# Patient Record
Sex: MALE | Race: BLACK OR AFRICAN AMERICAN | NOT HISPANIC OR LATINO | URBAN - METROPOLITAN AREA
[De-identification: names, ages, dates, MRNs, and addresses within clinical notes are randomized per-mention and may not be internally consistent; named-entity substitution may affect disease eponyms.]

---

## 2022-03-17 ENCOUNTER — INPATIENT (INPATIENT)
Facility: HOSPITAL | Age: 57
LOS: 3 days | Discharge: HOME | End: 2022-03-21
Attending: INTERNAL MEDICINE | Admitting: INTERNAL MEDICINE
Payer: COMMERCIAL

## 2022-03-17 PROCEDURE — 93010 ELECTROCARDIOGRAM REPORT: CPT

## 2022-03-17 PROCEDURE — 99285 EMERGENCY DEPT VISIT HI MDM: CPT | Mod: 25

## 2022-03-18 VITALS
TEMPERATURE: 98 F | DIASTOLIC BLOOD PRESSURE: 89 MMHG | RESPIRATION RATE: 16 BRPM | OXYGEN SATURATION: 100 % | SYSTOLIC BLOOD PRESSURE: 148 MMHG | HEART RATE: 74 BPM

## 2022-03-18 DIAGNOSIS — I10 ESSENTIAL (PRIMARY) HYPERTENSION: ICD-10-CM

## 2022-03-18 DIAGNOSIS — Z98.890 OTHER SPECIFIED POSTPROCEDURAL STATES: Chronic | ICD-10-CM

## 2022-03-18 DIAGNOSIS — R07.9 CHEST PAIN, UNSPECIFIED: ICD-10-CM

## 2022-03-18 DIAGNOSIS — Z29.9 ENCOUNTER FOR PROPHYLACTIC MEASURES, UNSPECIFIED: ICD-10-CM

## 2022-03-18 DIAGNOSIS — J45.909 UNSPECIFIED ASTHMA, UNCOMPLICATED: ICD-10-CM

## 2022-03-18 LAB
ALBUMIN SERPL ELPH-MCNC: 4.4 G/DL — SIGNIFICANT CHANGE UP (ref 3.3–5)
ALP SERPL-CCNC: 48 U/L — SIGNIFICANT CHANGE UP (ref 40–120)
ALT FLD-CCNC: 23 U/L — SIGNIFICANT CHANGE UP (ref 4–41)
ANION GAP SERPL CALC-SCNC: 15 MMOL/L — HIGH (ref 7–14)
AST SERPL-CCNC: 31 U/L — SIGNIFICANT CHANGE UP (ref 4–40)
BASE EXCESS BLDV CALC-SCNC: 1 MMOL/L — SIGNIFICANT CHANGE UP (ref -2–3)
BASOPHILS # BLD AUTO: 0.07 K/UL — SIGNIFICANT CHANGE UP (ref 0–0.2)
BASOPHILS NFR BLD AUTO: 1.1 % — SIGNIFICANT CHANGE UP (ref 0–2)
BILIRUB SERPL-MCNC: 0.3 MG/DL — SIGNIFICANT CHANGE UP (ref 0.2–1.2)
BLOOD GAS VENOUS COMPREHENSIVE RESULT: SIGNIFICANT CHANGE UP
BUN SERPL-MCNC: 18 MG/DL — SIGNIFICANT CHANGE UP (ref 7–23)
CALCIUM SERPL-MCNC: 8.9 MG/DL — SIGNIFICANT CHANGE UP (ref 8.4–10.5)
CHLORIDE BLDV-SCNC: 102 MMOL/L — SIGNIFICANT CHANGE UP (ref 96–108)
CHLORIDE SERPL-SCNC: 102 MMOL/L — SIGNIFICANT CHANGE UP (ref 98–107)
CO2 BLDV-SCNC: 26.4 MMOL/L — HIGH (ref 22–26)
CO2 SERPL-SCNC: 20 MMOL/L — LOW (ref 22–31)
CREAT SERPL-MCNC: 1 MG/DL — SIGNIFICANT CHANGE UP (ref 0.5–1.3)
D DIMER BLD IA.RAPID-MCNC: <150 NG/ML DDU — SIGNIFICANT CHANGE UP
EGFR: 88 ML/MIN/1.73M2 — SIGNIFICANT CHANGE UP
EOSINOPHIL # BLD AUTO: 0.25 K/UL — SIGNIFICANT CHANGE UP (ref 0–0.5)
EOSINOPHIL NFR BLD AUTO: 4 % — SIGNIFICANT CHANGE UP (ref 0–6)
GAS PNL BLDV: 135 MMOL/L — LOW (ref 136–145)
GLUCOSE BLDV-MCNC: 100 MG/DL — HIGH (ref 70–99)
GLUCOSE SERPL-MCNC: 106 MG/DL — HIGH (ref 70–99)
HCO3 BLDV-SCNC: 25 MMOL/L — SIGNIFICANT CHANGE UP (ref 22–29)
HCT VFR BLD CALC: 39.7 % — SIGNIFICANT CHANGE UP (ref 39–50)
HCT VFR BLDA CALC: 41 % — SIGNIFICANT CHANGE UP (ref 39–51)
HGB BLD CALC-MCNC: 13.7 G/DL — SIGNIFICANT CHANGE UP (ref 13–17)
HGB BLD-MCNC: 13.5 G/DL — SIGNIFICANT CHANGE UP (ref 13–17)
IANC: 4.08 K/UL — SIGNIFICANT CHANGE UP (ref 1.5–8.5)
IMM GRANULOCYTES NFR BLD AUTO: 0.3 % — SIGNIFICANT CHANGE UP (ref 0–1.5)
LACTATE BLDV-MCNC: 0.9 MMOL/L — SIGNIFICANT CHANGE UP (ref 0.5–2)
LYMPHOCYTES # BLD AUTO: 1.21 K/UL — SIGNIFICANT CHANGE UP (ref 1–3.3)
LYMPHOCYTES # BLD AUTO: 19.2 % — SIGNIFICANT CHANGE UP (ref 13–44)
MAGNESIUM SERPL-MCNC: 2.2 MG/DL — SIGNIFICANT CHANGE UP (ref 1.6–2.6)
MCHC RBC-ENTMCNC: 28.8 PG — SIGNIFICANT CHANGE UP (ref 27–34)
MCHC RBC-ENTMCNC: 34 GM/DL — SIGNIFICANT CHANGE UP (ref 32–36)
MCV RBC AUTO: 84.6 FL — SIGNIFICANT CHANGE UP (ref 80–100)
MONOCYTES # BLD AUTO: 0.67 K/UL — SIGNIFICANT CHANGE UP (ref 0–0.9)
MONOCYTES NFR BLD AUTO: 10.6 % — SIGNIFICANT CHANGE UP (ref 2–14)
NEUTROPHILS # BLD AUTO: 4.08 K/UL — SIGNIFICANT CHANGE UP (ref 1.8–7.4)
NEUTROPHILS NFR BLD AUTO: 64.8 % — SIGNIFICANT CHANGE UP (ref 43–77)
NRBC # BLD: 0 /100 WBCS — SIGNIFICANT CHANGE UP
NRBC # FLD: 0 K/UL — SIGNIFICANT CHANGE UP
PCO2 BLDV: 38 MMHG — LOW (ref 42–55)
PH BLDV: 7.43 — SIGNIFICANT CHANGE UP (ref 7.32–7.43)
PLATELET # BLD AUTO: 200 K/UL — SIGNIFICANT CHANGE UP (ref 150–400)
PO2 BLDV: 53 MMHG — SIGNIFICANT CHANGE UP
POTASSIUM BLDV-SCNC: 4.1 MMOL/L — SIGNIFICANT CHANGE UP (ref 3.5–5.1)
POTASSIUM SERPL-MCNC: 5.1 MMOL/L — SIGNIFICANT CHANGE UP (ref 3.5–5.3)
POTASSIUM SERPL-SCNC: 5.1 MMOL/L — SIGNIFICANT CHANGE UP (ref 3.5–5.3)
PROT SERPL-MCNC: 7.7 G/DL — SIGNIFICANT CHANGE UP (ref 6–8.3)
RBC # BLD: 4.69 M/UL — SIGNIFICANT CHANGE UP (ref 4.2–5.8)
RBC # FLD: 14.9 % — HIGH (ref 10.3–14.5)
SAO2 % BLDV: 86.3 % — SIGNIFICANT CHANGE UP
SARS-COV-2 RNA SPEC QL NAA+PROBE: SIGNIFICANT CHANGE UP
SODIUM SERPL-SCNC: 137 MMOL/L — SIGNIFICANT CHANGE UP (ref 135–145)
TROPONIN T, HIGH SENSITIVITY RESULT: 7 NG/L — SIGNIFICANT CHANGE UP
TROPONIN T, HIGH SENSITIVITY RESULT: <6 NG/L — SIGNIFICANT CHANGE UP
WBC # BLD: 6.3 K/UL — SIGNIFICANT CHANGE UP (ref 3.8–10.5)
WBC # FLD AUTO: 6.3 K/UL — SIGNIFICANT CHANGE UP (ref 3.8–10.5)

## 2022-03-18 PROCEDURE — 93018 CV STRESS TEST I&R ONLY: CPT | Mod: GC

## 2022-03-18 PROCEDURE — 71046 X-RAY EXAM CHEST 2 VIEWS: CPT | Mod: 26

## 2022-03-18 PROCEDURE — 99223 1ST HOSP IP/OBS HIGH 75: CPT

## 2022-03-18 PROCEDURE — 99236 HOSP IP/OBS SAME DATE HI 85: CPT

## 2022-03-18 PROCEDURE — 78452 HT MUSCLE IMAGE SPECT MULT: CPT | Mod: 26

## 2022-03-18 PROCEDURE — 93306 TTE W/DOPPLER COMPLETE: CPT | Mod: 26

## 2022-03-18 PROCEDURE — 93016 CV STRESS TEST SUPVJ ONLY: CPT | Mod: GC

## 2022-03-18 RX ORDER — ALBUTEROL 90 UG/1
2 AEROSOL, METERED ORAL
Qty: 0 | Refills: 0 | DISCHARGE

## 2022-03-18 RX ORDER — KETOROLAC TROMETHAMINE 30 MG/ML
30 SYRINGE (ML) INJECTION EVERY 6 HOURS
Refills: 0 | Status: DISCONTINUED | OUTPATIENT
Start: 2022-03-18 | End: 2022-03-18

## 2022-03-18 RX ORDER — ACETAMINOPHEN 500 MG
975 TABLET ORAL ONCE
Refills: 0 | Status: COMPLETED | OUTPATIENT
Start: 2022-03-18 | End: 2022-03-18

## 2022-03-18 RX ORDER — CETIRIZINE HYDROCHLORIDE 10 MG/1
1 TABLET ORAL
Qty: 0 | Refills: 0 | DISCHARGE

## 2022-03-18 RX ORDER — AMLODIPINE BESYLATE 2.5 MG/1
5 TABLET ORAL ONCE
Refills: 0 | Status: COMPLETED | OUTPATIENT
Start: 2022-03-18 | End: 2022-03-18

## 2022-03-18 RX ORDER — ALBUTEROL 90 UG/1
2 AEROSOL, METERED ORAL EVERY 6 HOURS
Refills: 0 | Status: DISCONTINUED | OUTPATIENT
Start: 2022-03-18 | End: 2022-03-18

## 2022-03-18 RX ORDER — MONTELUKAST 4 MG/1
10 TABLET, CHEWABLE ORAL DAILY
Refills: 0 | Status: DISCONTINUED | OUTPATIENT
Start: 2022-03-18 | End: 2022-03-21

## 2022-03-18 RX ORDER — ACETAMINOPHEN 500 MG
650 TABLET ORAL EVERY 6 HOURS
Refills: 0 | Status: DISCONTINUED | OUTPATIENT
Start: 2022-03-18 | End: 2022-03-21

## 2022-03-18 RX ORDER — MOMETASONE FUROATE 220 UG/1
1 INHALANT RESPIRATORY (INHALATION)
Qty: 0 | Refills: 0 | DISCHARGE

## 2022-03-18 RX ORDER — AMLODIPINE BESYLATE 2.5 MG/1
1 TABLET ORAL
Qty: 0 | Refills: 0 | DISCHARGE

## 2022-03-18 RX ORDER — ALBUTEROL 90 UG/1
2 AEROSOL, METERED ORAL EVERY 6 HOURS
Refills: 0 | Status: DISCONTINUED | OUTPATIENT
Start: 2022-03-18 | End: 2022-03-21

## 2022-03-18 RX ORDER — ATORVASTATIN CALCIUM 80 MG/1
40 TABLET, FILM COATED ORAL AT BEDTIME
Refills: 0 | Status: DISCONTINUED | OUTPATIENT
Start: 2022-03-18 | End: 2022-03-21

## 2022-03-18 RX ORDER — MOMETASONE FUROATE 220 UG/1
1 INHALANT RESPIRATORY (INHALATION)
Refills: 0 | Status: DISCONTINUED | OUTPATIENT
Start: 2022-03-18 | End: 2022-03-21

## 2022-03-18 RX ORDER — MORPHINE SULFATE 50 MG/1
2 CAPSULE, EXTENDED RELEASE ORAL EVERY 4 HOURS
Refills: 0 | Status: DISCONTINUED | OUTPATIENT
Start: 2022-03-18 | End: 2022-03-21

## 2022-03-18 RX ORDER — HEPARIN SODIUM 5000 [USP'U]/ML
5000 INJECTION INTRAVENOUS; SUBCUTANEOUS EVERY 8 HOURS
Refills: 0 | Status: DISCONTINUED | OUTPATIENT
Start: 2022-03-18 | End: 2022-03-21

## 2022-03-18 RX ORDER — AMLODIPINE BESYLATE 2.5 MG/1
5 TABLET ORAL DAILY
Refills: 0 | Status: DISCONTINUED | OUTPATIENT
Start: 2022-03-18 | End: 2022-03-21

## 2022-03-18 RX ORDER — MONTELUKAST 4 MG/1
1 TABLET, CHEWABLE ORAL
Qty: 0 | Refills: 0 | DISCHARGE

## 2022-03-18 RX ORDER — ASPIRIN/CALCIUM CARB/MAGNESIUM 324 MG
81 TABLET ORAL DAILY
Refills: 0 | Status: DISCONTINUED | OUTPATIENT
Start: 2022-03-19 | End: 2022-03-21

## 2022-03-18 RX ORDER — ASPIRIN/CALCIUM CARB/MAGNESIUM 324 MG
324 TABLET ORAL ONCE
Refills: 0 | Status: COMPLETED | OUTPATIENT
Start: 2022-03-18 | End: 2022-03-18

## 2022-03-18 RX ORDER — LORATADINE 10 MG/1
10 TABLET ORAL DAILY
Refills: 0 | Status: DISCONTINUED | OUTPATIENT
Start: 2022-03-18 | End: 2022-03-21

## 2022-03-18 RX ADMIN — Medication 324 MILLIGRAM(S): at 15:03

## 2022-03-18 RX ADMIN — MOMETASONE FUROATE 1 PUFF(S): 220 INHALANT RESPIRATORY (INHALATION) at 21:29

## 2022-03-18 RX ADMIN — AMLODIPINE BESYLATE 5 MILLIGRAM(S): 2.5 TABLET ORAL at 22:12

## 2022-03-18 RX ADMIN — MOMETASONE FUROATE 1 PUFF(S): 220 INHALANT RESPIRATORY (INHALATION) at 12:39

## 2022-03-18 RX ADMIN — LORATADINE 10 MILLIGRAM(S): 10 TABLET ORAL at 12:44

## 2022-03-18 RX ADMIN — MONTELUKAST 10 MILLIGRAM(S): 4 TABLET, CHEWABLE ORAL at 12:39

## 2022-03-18 RX ADMIN — ATORVASTATIN CALCIUM 40 MILLIGRAM(S): 80 TABLET, FILM COATED ORAL at 21:29

## 2022-03-18 RX ADMIN — Medication 975 MILLIGRAM(S): at 03:17

## 2022-03-18 RX ADMIN — HEPARIN SODIUM 5000 UNIT(S): 5000 INJECTION INTRAVENOUS; SUBCUTANEOUS at 21:29

## 2022-03-18 NOTE — ED CDU PROVIDER INITIAL DAY NOTE - RESPIRATORY [-], MLM
Pt denies SOB, dyspnea.  Pt admits to chronic baseline wheezing, states presently feels he is at his usual baseline./no cough/no exertional dyspnea/no orthopnea

## 2022-03-18 NOTE — H&P ADULT - NSHPLABSRESULTS_GEN_ALL_CORE
LABS:                        13.5   6.30  )-----------( 200      ( 18 Mar 2022 01:22 )             39.7     03-18    137  |  102  |  18  ----------------------------<  106<H>  5.1   |  20<L>  |  1.00    Ca    8.9      18 Mar 2022 01:22  Mg     2.20     03-18    TPro  7.7  /  Alb  4.4  /  TBili  0.3  /  DBili  x   /  AST  31  /  ALT  23  /  AlkPhos  48  03-18    VBG 03-18 @ 01:22  pH: 7.43/pCO2: 38 /pO2: 53/HCO3: 25/lactate: 0.9      EKG:    RADIOLOGY & ADDITIONAL TESTS:  ra< from: Xray Chest 2 Views PA/Lat (03.18.22 @ 01:17) >  IMPRESSION: Clear lungs. LABS:                        13.5   6.30  )-----------( 200      ( 18 Mar 2022 01:22 )             39.7     03-18    137  |  102  |  18  ----------------------------<  106<H>  5.1   |  20<L>  |  1.00    Ca    8.9      18 Mar 2022 01:22  Mg     2.20     03-18    TPro  7.7  /  Alb  4.4  /  TBili  0.3  /  DBili  x   /  AST  31  /  ALT  23  /  AlkPhos  48  03-18    VBG 03-18 @ 01:22  pH: 7.43/pCO2: 38 /pO2: 53/HCO3: 25/lactate: 0.9      EKG:    RADIOLOGY & ADDITIONAL TESTS:  ra< from: Xray Chest 2 Views PA/Lat (03.18.22 @ 01:17) >  IMPRESSION: Clear lungs.    c< from: Transthoracic Echocardiogram (03.18.22 @ 08:28) >  CONCLUSIONS:EF 62 %  1. Normal mitral valve. Mild mitral regurgitation.  2. Normal left ventricular internal dimensions and wall  thicknesses.  3. Endocardium not well visualized; grossly normal left  ventricular systolic function.  4. The right ventricle is not well visualized; grossly  normal right ventricular systolic function.    3/18 nuclear med stress test - ABN LABS:                        13.5   6.30  )-----------( 200      ( 18 Mar 2022 01:22 )             39.7     03-18    137  |  102  |  18  ----------------------------<  106<H>  5.1   |  20<L>  |  1.00    Ca    8.9      18 Mar 2022 01:22  Mg     2.20     03-18    TPro  7.7  /  Alb  4.4  /  TBili  0.3  /  DBili  x   /  AST  31  /  ALT  23  /  AlkPhos  48  03-18    VBG 03-18 @ 01:22  pH: 7.43/pCO2: 38 /pO2: 53/HCO3: 25/lactate: 0.9      EKG: 3/18 , nsr at 69 bpm    RADIOLOGY & ADDITIONAL TESTS:  ra< from: Xray Chest 2 Views PA/Lat (03.18.22 @ 01:17) >  IMPRESSION: Clear lungs.    c< from: Transthoracic Echocardiogram (03.18.22 @ 08:28) >  CONCLUSIONS:EF 62 %  1. Normal mitral valve. Mild mitral regurgitation.  2. Normal left ventricular internal dimensions and wall  thicknesses.  3. Endocardium not well visualized; grossly normal left  ventricular systolic function.  4. The right ventricle is not well visualized; grossly  normal right ventricular systolic function.    3/18 nuclear med stress test - ABN

## 2022-03-18 NOTE — ED ADULT NURSE REASSESSMENT NOTE - NS ED NURSE REASSESS COMMENT FT1
Break coverage RN: Pt A&Ox4, resting in stretcher. RR even and unlabored. VSS and noted. Pt c/o intermittent CP, MD made aware. Pt sinus on CM, denies CP. No acute distress noted. Safety maintained

## 2022-03-18 NOTE — H&P ADULT - NSHPREVIEWOFSYSTEMS_GEN_ALL_CORE
CONSTITUTIONAL: No fever, weight loss, or fatigue  EYES: No eye pain, visual disturbances, or discharge  ENMT:  No difficulty hearing, tinnitus, vertigo; No sinus or throat pain  NECK: No pain or stiffness  BREASTS: No pain, masses, or nipple discharge  RESPIRATORY: No cough, wheezing, chills or hemoptysis; POSITIVE  shortness of breath on admission- non now  CARDIOVASCULAR: POSITIVE chest pain, NO palpitations, dizziness, or leg swelling  GASTROINTESTINAL: No abdominal or epigastric pain. No nausea, vomiting, or hematemesis; No diarrhea or constipation. No melena or hematochezia.  GENITOURINARY: No dysuria, frequency, hematuria, or incontinence  NEUROLOGICAL: No headaches, memory loss, loss of strength, numbness, or tremors  SKIN: No itching, burning, rashes, or lesions   LYMPH NODES: No enlarged glands  ENDOCRINE: No heat or cold intolerance; No hair loss  MUSCULOSKELETAL: No muscle or back pain  PSYCHIATRIC: No depression, anxiety, mood swings, or difficulty sleeping  HEME/LYMPH: No easy bruising, or bleeding gums  ALLERGY AND IMMUNOLOGIC: No hives or eczema

## 2022-03-18 NOTE — H&P ADULT - PROBLEM SELECTOR PLAN 3
c/w Asmanex and Albuterol and singuliar c/w Asmanex and Albuterol and Singulair  no asthma exacerbation at this time.

## 2022-03-18 NOTE — ED CDU PROVIDER INITIAL DAY NOTE - PHYSICAL EXAMINATION
CONSTITUTIONAL:  Well appearing, awake, alert, oriented to person, place, time/situation and in no apparent distress.  Pt. is objectively comfortable appearing and verbalizing in full, clear, effortless sentences.  ENMT: NC/AT.  Airway patent.  Nasal mucosa clear.  Moist mucous membranes.  Neck supple.  EYES:  Clear OU.  CARDIAC:  Normal rate, regular rhythm.  Heart sounds S1 S2.  No murmurs, gallops, or rubs.  RESPIRATORY:  Breath sounds equal bilaterally.  Faint wheezing noted throughout (mainly expiratory, with occasional end-inspiratory wheeze)** , no rales, no rhonchi, no retractions.  GASTROINTESTINAL:  Abdomen soft, protuberant but non-distended, non-tender.  No rebound, no guarding.  NEUROLOGICAL:  Alert and oriented to person/place/time/situation.  No focal deficits; no tremors noted.   MUSCULOSKELETAL:  Range of motion is not limited.  LE symmetrical bilaterally; no pitting edema noted.  No calf TTP bilaterally.  SKIN:  Skin color unremarkable.  Skin warm, dry, and intact.    PSYCHIATRIC:  Alert and oriented to person/place/time/situation.  Mood and affect WNL.  No apparent risk to self or others.    **Note:  Pt declines offer for albuterol at time of exam.

## 2022-03-18 NOTE — ED PROVIDER NOTE - IV ALTEPLASE DOOR HIDDEN
[FreeTextEntry1] : I discussed the pathophysiology of acid reflux and its effect on the laryngo-pharynx.\par I explained the need to first control the diet as much as possible with having early dinners, avoiding certain types of food in the evening including coffee, tomato sauce, chocolate, garlic...\par I also explained the need for medication when needed. I also discussed the effect and safety profile of the medication.\par  show

## 2022-03-18 NOTE — H&P ADULT - PROBLEM SELECTOR PLAN 4
hep SQ hep SQ    plan d/w patient / Cardiology Dr Boyce and PA.  Plan for cardiac cath on Monday 3/21  Covid swab on 3/20 [facility]

## 2022-03-18 NOTE — H&P ADULT - PROBLEM SELECTOR PLAN 1
3/18 DDimer <150, HS trop <6--> 7, Pro-bnp 29. Covid 3/18- negative, cxr- clear  Patient had echo and nuclear stress test- found to have abnormal nuclear stress test on 3/18 .  Seen by cardiology with plan for cardiac cath  Tylenol prn mild pain.  Morphine 2 mg q4 prn mod to severe pain 3/18 DDimer <150, HS trop <6--> 7, Pro-bnp 29. Covid 3/18- negative, cxr- clear  Patient had echo and nuclear stress test- found to have abnormal nuclear stress test on 3/18 .  Seen by cardiology with plan for cardiac cath  Tylenol prn mild pain.  Morphine 2 mg q4 prn mod to severe pain  Hgb aic 5.5  Monitor FLP in AM 3/18 DDimer <150, HS trop <6--> 7, Pro-bnp 29. Covid 3/18- negative, cxr- clear  Patient had echo and nuclear stress test- found to have abnormal nuclear stress test on 3/18 .  Seen by cardiology with plan for cardiac cath  Tylenol prn mild pain.  Morphine 2 mg q4 prn mod to severe pain  started on asa 324 mg 3/18, c/w asa QD  Hgb aic 5.5  Monitor FLP in AM  Start statin lipitor at 40 mg qhs 3/18 DDimer <150, HS trop <6--> 7, Pro-bnp 29. Covid 3/18- negative, cxr- clear  Patient had echo and nuclear stress test- found to have abnormal nuclear stress test on 3/18 .  Seen by cardiology with plan for cardiac cath  Tylenol prn mild pain.  Morphine 2 mg q4 prn mod to severe pain  started on asa 324 mg 3/18, c/w asa 81 mg QD on 3/19  Hgb aic 5.5  Monitor FLP in AM  Start statin Lipitor at 40 mg qhs

## 2022-03-18 NOTE — ED CDU PROVIDER INITIAL DAY NOTE - NS ED ATTENDING STATEMENT MOD
This was a shared visit with the ERNESTINA. I reviewed and verified the documentation and independently performed the documented:

## 2022-03-18 NOTE — H&P ADULT - HISTORY OF PRESENT ILLNESS
57 yo M with h/o Asthma last hospitalized 10 years ago, intubated Twice - last intubation in 1996, HTN, Lupus Psoriasis ( topical steroids), presents with chest pain x 1 day. Pain started on 3/17 inleft axilla area and radiates to Substernal area and moved to midchest and to L shoulder. at present pain is 2/10 , was worse last night 5/10 but responded to Tylenol. He notes SOB on admission but this has subsided. No palpitations or N/V.   Patient was observed in CDU  3/18 DDimer <150, HS trop <6--> 7, Pro-bnp 29. Covid 3/18- negative, cxr- clear  Patient had echo and nuclear stress test- found to have abnormal nuclear stress test on 3/18 .  Seen by cardiology with plan for cardiac cath 57 yo M with h/o Asthma last hospitalized 10 years ago, intubated Twice - last intubation in 1996, HTN, Lupus Psoriasis ( topical steroids), presents with chest pain x 1 day. Pain started on 3/17 inleft axilla area and radiates to Substernal area and moved to midchest and to L shoulder. at present pain is 2/10 , was worse last night 5/10 but responded to Tylenol. He notes SOB on admission but this has subsided. No palpitations or N/V.   Patient was observed in CDU  3/18 DDimer <150, HS trop <6--> 7, Pro-bnp 29. Covid 3/18- negative, cxr- clear  Patient had echo and nuclear stress test- found to have abnormal nuclear stress test on 3/18 . asa 342 mg given 3/18  Seen by cardiology with plan for cardiac cath

## 2022-03-18 NOTE — ED ADULT NURSE NOTE - OBJECTIVE STATEMENT
Pt received to room 20. A&Ox4. Ambulatory at baseline. Pmh of asthma, psoriasis, lupus. Pt c/o of chest pain occurring under L breast area, "occasionally radiating to L shoulder" w/ SOB. Pt states the chest pain began "more than 24 hours ago and is on and off." Pt endorses taking 6 baby aspirin throughout the period of having this chest pain with slight relief. Pt states when pain is present it is 8/10 and sharp, but currently is a 2/10. Pt states he has experienced chest pain like this before and had a stress test done. Pt states he was unsure of the results, but was told "I have a leaky valve." Pt vitally stable. Pt resp even unlabored, breath sounds clear anterior and posterior, abd soft nontender, bowel sounds heard in all four quadrants, pedal pulses 2+ bilaterally.  Denies nausea, vomiting, diarrhea, headache, numbness/tingling, visual disturbances, fevers, chills.

## 2022-03-18 NOTE — CONSULT NOTE ADULT - SUBJECTIVE AND OBJECTIVE BOX
DATE OF SERVICE: 03-18-22     CHIEF COMPLAINT:Patient is a 56y old  Male who presents with a chief complaint of chest pain (18 Mar 2022 14:49)      HISTORY OF PRESENT ILLNESS:HPI:  55 yo M with h/o Asthma last hospitalized 10 years ago, intubated Twice - last intubation in 1996, HTN, Lupus Psoriasis ( topical steroids), presents with chest pain x 1 day. Pain started on 3/17 inleft axilla area and radiates to Substernal area and moved to midchest and to L shoulder. at present pain is 2/10 , was worse last night 5/10 but responded to Tylenol. He notes SOB on admission but this has subsided. No palpitations or N/V.   Patient was observed in CDU  3/18 DDimer <150, HS trop <6--> 7, Pro-bnp 29. Covid 3/18- negative, cxr- clear  Patient had echo and nuclear stress test- found to have abnormal nuclear stress test on 3/18 . asa 342 mg given 3/18  Seen by cardiology with plan for cardiac cath (18 Mar 2022 14:49)      PAST MEDICAL & SURGICAL HISTORY:  HTN (hypertension)    Lupus    Asthma    Psoriasis    History of hernia surgery            MEDICATIONS:  amLODIPine   Tablet 5 milliGRAM(s) Oral daily  aspirin  chewable 81 milliGRAM(s) Oral daily  heparin   Injectable 5000 Unit(s) SubCutaneous every 8 hours      ALBUTerol    90 MICROgram(s) HFA Inhaler 2 Puff(s) Inhalation every 6 hours PRN  loratadine 10 milliGRAM(s) Oral daily  mometasone 220 MICROgram(s) Inhaler 1 Puff(s) Inhalation two times a day  montelukast 10 milliGRAM(s) Oral daily    acetaminophen     Tablet .. 650 milliGRAM(s) Oral every 6 hours PRN  morphine  - Injectable 2 milliGRAM(s) IV Push every 4 hours PRN      atorvastatin 40 milliGRAM(s) Oral at bedtime    multivitamin 1 Tablet(s) Oral daily      FAMILY HISTORY:  FH: HTN (hypertension) (Father, Mother)        Non-contributory    SOCIAL HISTORY:    [ ] not a current cigarette smoker, +social cigar smoker  Allergies    No Known Allergies    Intolerances    	    REVIEW OF SYSTEMS:  CONSTITUTIONAL: No fever  EYES: No eye pain, visual disturbances, or discharge  ENMT:  No difficulty hearing, tinnitus  NECK: No pain or stiffness  RESPIRATORY: No cough, wheezing,  CARDIOVASCULAR: + chest pain, no palpitations, passing out, dizziness, or leg swelling  GASTROINTESTINAL:  No nausea, vomiting, diarrhea or constipation. No melena.  GENITOURINARY: No dysuria, hematuria  NEUROLOGICAL: No stroke like symptoms  SKIN: No burning or lesions   ENDOCRINE: No heat or cold intolerance  MUSCULOSKELETAL: No joint pain or swelling  PSYCHIATRIC: No  anxiety, mood swings  HEME/LYMPH: No bleeding gums  ALLERGY AND IMMUNOLOGIC: No hives or eczema	    All other ROS negative    PHYSICAL EXAM:  T(C): 36.9 (03-19-22 @ 04:54), Max: 37.2 (03-18-22 @ 12:18)  HR: 71 (03-19-22 @ 04:54) (58 - 72)  BP: 102/67 (03-19-22 @ 04:54) (102/67 - 163/108)  RR: 16 (03-19-22 @ 04:54) (14 - 17)  SpO2: 98% (03-19-22 @ 04:54) (97% - 99%)  Wt(kg): --  I&O's Summary      Appearance: Normal	  HEENT:   Normal oral mucosa, EOMI	  Cardiovascular:  S1 S2, No JVD,    Respiratory: Lungs clear to auscultation	  Psychiatry: Alert  Gastrointestinal:  Soft, Non-tender, + BS	  Skin: No rashes   Neurologic: Non-focal  Extremities:  No edema  Vascular: Peripheral pulses palpable    	    	  	  CARDIAC MARKERS:  Labs personally reviewed by me                                  13.5   6.30  )-----------( 200      ( 18 Mar 2022 01:22 )             39.7     03-18    137  |  102  |  18  ----------------------------<  106<H>  5.1   |  20<L>  |  1.00    Ca    8.9      18 Mar 2022 01:22  Mg     2.20     03-18    TPro  7.7  /  Alb  4.4  /  TBili  0.3  /  DBili  x   /  AST  31  /  ALT  23  /  AlkPhos  48  03-18          EKG: Personally reviewed by me - NSR, no ischemic changes  Radiology: Personally reviewed by me - CXR clear lungs      Labs, Radiology, Cardiology, and Other Results: LABS:                        13.5   6.30  )-----------( 200      ( 18 Mar 2022 01:22 )             39.7     03-18    137  |  102  |  18  ----------------------------<  106<H>  5.1   |  20<L>  |  1.00    Ca    8.9      18 Mar 2022 01:22  Mg     2.20     03-18    TPro  7.7  /  Alb  4.4  /  TBili  0.3  /  DBili  x   /  AST  31  /  ALT  23  /  AlkPhos  48  03-18    VBG 03-18 @ 01:22  pH: 7.43/pCO2: 38 /pO2: 53/HCO3: 25/lactate: 0.9      EKG: 3/18 , nsr at 69 bpm    RADIOLOGY & ADDITIONAL TESTS:  ra< from: Xray Chest 2 Views PA/Lat (03.18.22 @ 01:17) >  IMPRESSION: Clear lungs.    c< from: Transthoracic Echocardiogram (03.18.22 @ 08:28) >  CONCLUSIONS:EF 62 %  1. Normal mitral valve. Mild mitral regurgitation.  2. Normal left ventricular internal dimensions and wall  thicknesses.  3. Endocardium not well visualized; grossly normal left  ventricular systolic function.  4. The right ventricle is not well visualized; grossly  normal right ventricular systolic function.    3/18 nuclear med stress test - ABN      Assessment and Plan:   Assessment:  · Assessment	  55 yo M with h/o Asthma last hospitalized 10 years ago, intubated Twice - last intubation in 1996, HTN, Lupus Psoriasis ( topical steroids), NO DM or HLD,  presents with chest pain x 1 day. Pain started on 3/17 inleft axilla area and radiates to Substernal area and moved to midchest and to L shoulder. at present pain is 2/10 , was worse last night 5/10 but responded to Tylenol. He notes SOB on admission but this has subsided. No palpitations or N/V.   Patient was observed in CDU  3/18 DDimer <150, HS trop <6--> 7, Pro-bnp 29. Covid 3/18- negative, cxr- clear  Patient had echo and nuclear stress test- found to have abnormal nuclear stress test on 3/18 .  Seen by cardiology with plan for cardiac cath  started on asa 324 mg on 3/18      Problem/Plan - 1:  ·  Problem: Acute chest pain.   ·  Plan: 3/18 DDimer <150, HS trop <6--> 7, Pro-bnp 29. Covid 3/18- negative, cxr- clear  Patient had echo and nuclear stress test- found to have abnormal nuclear stress test on 3/18 .  RIsks and benefits of cardiac cath discussed with pt, agreeable to proceed given recurrent pain  started on asa 324 mg 3/18, c/w asa 81 mg QD on 3/19  Hgb aic 5.5  Monitor FLP in AM  Start statin Lipitor at 40 mg qhs until CAD ruled in/out    Problem/Plan - 2:  ·  Problem: Hypertension.   ·  Plan: c/w Norvasc 5 mg qd hold sbp <100.    Problem/Plan - 3:  ·  Problem: Asthma.   ·  Plan: c/w Asmanex and Albuterol and Singulair  no asthma exacerbation at this time.    Problem/Plan - 4:  ·  Problem: Prophylactic measure.   ·  Plan: hep SQ  Covid swab on 3/20 [facility] for cath Monday          Differential diagnosis and plan of care discussed with patient after the evaluation. Counseling on diet, nutritional counseling, weight management, exercise and medication compliance was done.   Advanced care planning/advanced directives discussed with patient/family. DNR status including forceful chest compressions to attempt to restart the heart, ventilator support/artificial breathing, electric shock, artificial nutrition, health care proxy, Molst form all discussed with pt. Pt wishes to consider. More than fifteen minutes spent on discussing advanced directives. OMT on six regions for acute somatic dysfunctions done at the bedside. One hundred ten minutes spent on encounter, of which more than fifty percent of the encounter was spent counseling and/or coordinating care by the attending physician        Sonny Medina DO [facility]  Cardiovascular Medicine  [address] [place] [address], [address]  Office [phone]  Cell [phone]

## 2022-03-18 NOTE — H&P ADULT - NSHPSOCIALHISTORY_GEN_ALL_CORE
no IVDA/ marijuana  Positive cigar on weekends x 10 years  Positive Etoh use on weekends   with one child  Patient is a .

## 2022-03-18 NOTE — ED PROVIDER NOTE - CLINICAL SUMMARY MEDICAL DECISION MAKING FREE TEXT BOX
ddx likely include acs, gerd, pericarditis, ptx. pending EKGs/CXR/cardiac monitor/labs  2) reassess  The CXR assists in r/o PNA, Ptx & esophageal tears.  The pt doesn't appear to have a PE based on the Wells Score & there is no apparent DVT.  There are no signs of pericarditis, endocarditis, or myocarditis based on hx, risk factor analysis & the ED EKG.  There is no fever.  There also doesn't appear to be an aortic dissection based on hx, exam, and signs.   plan to obs if negative workup given risk factors

## 2022-03-18 NOTE — ED CDU PROVIDER INITIAL DAY NOTE - OBJECTIVE STATEMENT
57 yo male, PMH HTN, lupus, asthma, psoriasis; PSH hernia surgery (pt denies complications); pt presented to the ED c/o chest pain, intermittent, sharp in nature, occurring below the left breast and to the mid-sternal region; onset 3/16 evening approx 2100 hrs after pt ate dinner and was sitting, watching TV.  Pt. denies trigger; states pain has been waxing/waning since, not aggravated with activity.  Pt. states pain "cuts his breath"; pt denies dyspnea or SOB.  No associated cough, abdominal pain, N/V/D, back or neck pain, dizziness, syncope; pt denies recent illness otherwise.  Pt. states he had some similar pain approx. 3 years ago which prompted a stress test; pt states test showed a "leaky valve" but no further intervention was advised and pt is not following with a cardiologist.  Current pain 4/10; pt declines offer for analgesia at time of CDU evaluation.  In the ED, EKG non-ischemic, NSR rate @ 69 bpm with findings of likely early repolarization.  Trop x 2:  <6 ---> 7, pro-BNP 29, d-dimer <150, CXR with no acute pathology noted.  Pt. was dispo'd to CDU for plan:  Tele monitoring, stress test, echo, Tele Doc of Day evaluation, general observation care / monitoring.

## 2022-03-18 NOTE — ED CDU PROVIDER INITIAL DAY NOTE - PROGRESS NOTE DETAILS
Patient signed out to me to f/u stress & echo, tele doc eval. Patient reassessed, sitting comfortably in bed in NAD, denies any complaints. States feeling better, symptoms improved. Echo no acute finding. Abnormal stress test. Discussed results with Dr. Medina, recommending cardiac cath if pt is willing to stay. Spoke with patient regarding abnormal stress test, agrees to stay for cardiac cath on Monday. Pt admitted to med on tele.

## 2022-03-18 NOTE — H&P ADULT - ASSESSMENT
57 yo M with h/o Asthma last hospitalized 10 years ago, intubated Twice - last intubation in 1996, HTN, Lupus Psoriasis ( topical steroids), presents with chest pain x 1 day. Pain started on 3/17 inleft axilla area and radiates to Substernal area and moved to midchest and to L shoulder. at present pain is 2/10 , was worse last night 5/10 but responded to Tylenol. He notes SOB on admission but this has subsided. No palpitations or N/V.   Patient was observed in CDU  3/18 DDimer <150, HS trop <6--> 7, Pro-bnp 29. Covid 3/18- negative, cxr- clear  Patient had echo and nuclear stress test- found to have abnormal nuclear stress test on 3/18 .  Seen by cardiology with plan for cardiac cath   55 yo M with h/o Asthma last hospitalized 10 years ago, intubated Twice - last intubation in 1996, HTN, Lupus Psoriasis ( topical steroids), NO DM or HLD,  presents with chest pain x 1 day. Pain started on 3/17 inleft axilla area and radiates to Substernal area and moved to midchest and to L shoulder. at present pain is 2/10 , was worse last night 5/10 but responded to Tylenol. He notes SOB on admission but this has subsided. No palpitations or N/V.   Patient was observed in CDU  3/18 DDimer <150, HS trop <6--> 7, Pro-bnp 29. Covid 3/18- negative, cxr- clear  Patient had echo and nuclear stress test- found to have abnormal nuclear stress test on 3/18 .  Seen by cardiology with plan for cardiac cath   57 yo M with h/o Asthma last hospitalized 10 years ago, intubated Twice - last intubation in 1996, HTN, Lupus Psoriasis ( topical steroids), NO DM or HLD,  presents with chest pain x 1 day. Pain started on 3/17 inleft axilla area and radiates to Substernal area and moved to midchest and to L shoulder. at present pain is 2/10 , was worse last night 5/10 but responded to Tylenol. He notes SOB on admission but this has subsided. No palpitations or N/V.   Patient was observed in CDU  3/18 DDimer <150, HS trop <6--> 7, Pro-bnp 29. Covid 3/18- negative, cxr- clear  Patient had echo and nuclear stress test- found to have abnormal nuclear stress test on 3/18 .  Seen by cardiology with plan for cardiac cath  started on asa 324 mg on 3/18

## 2022-03-18 NOTE — PATIENT PROFILE ADULT - FALL HARM RISK - UNIVERSAL INTERVENTIONS
Bed in lowest position, wheels locked, appropriate side rails in place/Call bell, personal items and telephone in reach/Instruct patient to call for assistance before getting out of bed or chair/Non-slip footwear when patient is out of bed/[place] to call system/Physically safe environment - no spills, clutter or unnecessary equipment/Purposeful Proactive Rounding/Room/bathroom lighting operational, light cord in reach

## 2022-03-18 NOTE — ED PROVIDER NOTE - OBJECTIVE STATEMENT
57 y/o M hx of lupus on topical steroids p/w chest pain x 1 day. pain began yesterday at rest, underneath left breast. sxs intermittent, non radiating, sometimes pleuritic   last stress 3 yrs ago, was told he may have a "leaky valve". has not taken any other meds for his sxs.   pt denies any fever, chills, palpitations, sob, abdominal pain, v/d, dysuria, numbness  no SCD in family, no hx of dvt/pe

## 2022-03-18 NOTE — ED CDU PROVIDER DISPOSITION NOTE - CLINICAL COURSE
55 yo M, with PMH of HTN, lupus, asthma, psoriasis presents to the ED c/o intermittent chest pain below the left breast and mid-sternal region on 3/16 evening after pt ate dinner and was sitting, watching TV. In the ED, EKG non-ischemic, NSR rate @ 69 bpm with findings of likely early repolarization.  serial Trop <6, 7, pro-BNP 29, d-dimer <150, CXR with no acute pathology.  Pt. was sent to CDU for tele monitoring, stress test, echo, Tele Doc of Day evaluation. Echo w/ no acute finding. Abnormal stress test w/ small mild defect in proximal to mid inferior wall that mostly corrected with prone imaging for attenuation correction. Pt was evaluated by cardiologist Dr. Medina, recommending cardiac cath for abnormal stress test. Patient agreed to plan. Pt admitted for cardiac cath on Monday.

## 2022-03-18 NOTE — ED CDU PROVIDER DISPOSITION NOTE - ATTENDING CONTRIBUTION TO CARE
agree with PA note    "55 yo M, with PMH of HTN, lupus, asthma, psoriasis presents to the ED c/o intermittent chest pain below the left breast and mid-sternal region on 3/16 evening after pt ate dinner and was sitting, watching TV. In the ED, EKG non-ischemic, NSR rate @ 69 bpm with findings of likely early repolarization.  serial Trop <6, 7, pro-BNP 29, d-dimer <150, CXR with no acute pathology.  Pt. was sent to CDU for tele monitoring, stress test, echo, Tele Doc of Day evaluation. Echo w/ no acute finding. Abnormal stress test w/ small mild defect in proximal to mid inferior wall that mostly corrected with prone imaging for attenuation correction. Pt was evaluated by cardiologist Dr. Medina, recommending cardiac cath for abnormal stress test. Patient agreed to plan. Pt admitted for cardiac cath on Monday."    as above pt with abnormal stress; recommendation for cath by cards; pt agrees; stable; will be admitted

## 2022-03-18 NOTE — ED PROVIDER NOTE - ATTENDING CONTRIBUTION TO CARE
56M w h/o lupus only on topical steroids p/w left sided chest pain x 1 day, improved w/ rest, worse w/ exertion. States pain is also pleuritic. Denies trauma, rash, f/c, cough, uri symptoms, numbness, weakness, radiation.       Except as documented in the HPI,  all other systems are negative    CONSTITUTIONAL: NAD, awake, alert  HEAD: Normocephalic; atraumatic  EYES: EOMI, no nystagmus  ENMT: External appears normal, MMM  NECK: no tenderness, FROM  CARD: Normal Sl, S2; no audible murmurs,rubs  RESP: normal wob, lungs ctab  ABD: soft, non-distended; non-tender  EXT: no edema, normal ROM in all four extremities  SKIN: Warm, dry, no rashes  NEURO: aaox3, moving all extremities spontaneously      56M w h/o lupus pw chest pressure, worse w/ exertion and deep inspiration, plan to eval for acs, dimer as patient w/ h/o lupus, cdu if workup negative for further cardiac workup

## 2022-03-18 NOTE — H&P ADULT - PROBLEM SELECTOR PLAN 2
c/w Norvasc 5 mg qd hold sbp <100  consider BB c/w Norvasc 5 mg qd hold sbp <100  consider coreg at small dose BB c/w Norvasc 5 mg qd hold sbp <100

## 2022-03-19 LAB
ANION GAP SERPL CALC-SCNC: 13 MMOL/L — SIGNIFICANT CHANGE UP (ref 7–14)
BUN SERPL-MCNC: 16 MG/DL — SIGNIFICANT CHANGE UP (ref 7–23)
CALCIUM SERPL-MCNC: 8.9 MG/DL — SIGNIFICANT CHANGE UP (ref 8.4–10.5)
CHLORIDE SERPL-SCNC: 103 MMOL/L — SIGNIFICANT CHANGE UP (ref 98–107)
CHOLEST SERPL-MCNC: 233 MG/DL — HIGH
CO2 SERPL-SCNC: 23 MMOL/L — SIGNIFICANT CHANGE UP (ref 22–31)
CREAT SERPL-MCNC: 1.02 MG/DL — SIGNIFICANT CHANGE UP (ref 0.5–1.3)
EGFR: 86 ML/MIN/1.73M2 — SIGNIFICANT CHANGE UP
GLUCOSE SERPL-MCNC: 99 MG/DL — SIGNIFICANT CHANGE UP (ref 70–99)
HCT VFR BLD CALC: 40.5 % — SIGNIFICANT CHANGE UP (ref 39–50)
HCV AB S/CO SERPL IA: 0.09 S/CO — SIGNIFICANT CHANGE UP (ref 0–0.99)
HCV AB SERPL-IMP: SIGNIFICANT CHANGE UP
HDLC SERPL-MCNC: 56 MG/DL — SIGNIFICANT CHANGE UP
HGB BLD-MCNC: 13.9 G/DL — SIGNIFICANT CHANGE UP (ref 13–17)
LIPID PNL WITH DIRECT LDL SERPL: 142 MG/DL — HIGH
MAGNESIUM SERPL-MCNC: 2.2 MG/DL — SIGNIFICANT CHANGE UP (ref 1.6–2.6)
MCHC RBC-ENTMCNC: 29.3 PG — SIGNIFICANT CHANGE UP (ref 27–34)
MCHC RBC-ENTMCNC: 34.3 GM/DL — SIGNIFICANT CHANGE UP (ref 32–36)
MCV RBC AUTO: 85.4 FL — SIGNIFICANT CHANGE UP (ref 80–100)
NON HDL CHOLESTEROL: 177 MG/DL — HIGH
NRBC # BLD: 0 /100 WBCS — SIGNIFICANT CHANGE UP
NRBC # FLD: 0 K/UL — SIGNIFICANT CHANGE UP
PHOSPHATE SERPL-MCNC: 3.5 MG/DL — SIGNIFICANT CHANGE UP (ref 2.5–4.5)
PLATELET # BLD AUTO: 192 K/UL — SIGNIFICANT CHANGE UP (ref 150–400)
POTASSIUM SERPL-MCNC: 3.9 MMOL/L — SIGNIFICANT CHANGE UP (ref 3.5–5.3)
POTASSIUM SERPL-SCNC: 3.9 MMOL/L — SIGNIFICANT CHANGE UP (ref 3.5–5.3)
RBC # BLD: 4.74 M/UL — SIGNIFICANT CHANGE UP (ref 4.2–5.8)
RBC # FLD: 14.6 % — HIGH (ref 10.3–14.5)
SODIUM SERPL-SCNC: 139 MMOL/L — SIGNIFICANT CHANGE UP (ref 135–145)
TRIGL SERPL-MCNC: 174 MG/DL — HIGH
TROPONIN T, HIGH SENSITIVITY RESULT: 41 NG/L — SIGNIFICANT CHANGE UP
WBC # BLD: 4.88 K/UL — SIGNIFICANT CHANGE UP (ref 3.8–10.5)
WBC # FLD AUTO: 4.88 K/UL — SIGNIFICANT CHANGE UP (ref 3.8–10.5)

## 2022-03-19 RX ORDER — INFLUENZA VIRUS VACCINE 15; 15; 15; 15 UG/.5ML; UG/.5ML; UG/.5ML; UG/.5ML
0.5 SUSPENSION INTRAMUSCULAR ONCE
Refills: 0 | Status: DISCONTINUED | OUTPATIENT
Start: 2022-03-19 | End: 2022-03-21

## 2022-03-19 RX ADMIN — LORATADINE 10 MILLIGRAM(S): 10 TABLET ORAL at 14:57

## 2022-03-19 RX ADMIN — Medication 1 TABLET(S): at 12:42

## 2022-03-19 RX ADMIN — HEPARIN SODIUM 5000 UNIT(S): 5000 INJECTION INTRAVENOUS; SUBCUTANEOUS at 21:38

## 2022-03-19 RX ADMIN — MONTELUKAST 10 MILLIGRAM(S): 4 TABLET, CHEWABLE ORAL at 14:57

## 2022-03-19 RX ADMIN — MOMETASONE FUROATE 1 PUFF(S): 220 INHALANT RESPIRATORY (INHALATION) at 09:53

## 2022-03-19 RX ADMIN — HEPARIN SODIUM 5000 UNIT(S): 5000 INJECTION INTRAVENOUS; SUBCUTANEOUS at 15:02

## 2022-03-19 RX ADMIN — HEPARIN SODIUM 5000 UNIT(S): 5000 INJECTION INTRAVENOUS; SUBCUTANEOUS at 05:12

## 2022-03-19 RX ADMIN — ATORVASTATIN CALCIUM 40 MILLIGRAM(S): 80 TABLET, FILM COATED ORAL at 21:38

## 2022-03-19 RX ADMIN — Medication 81 MILLIGRAM(S): at 12:42

## 2022-03-19 RX ADMIN — MOMETASONE FUROATE 1 PUFF(S): 220 INHALANT RESPIRATORY (INHALATION) at 21:38

## 2022-03-19 NOTE — PROVIDER CONTACT NOTE (CHANGE IN STATUS NOTIFICATION) - ASSESSMENT
asymptomatic; denies chest pain, headache, shortness of breath, n/v, lightheadness, dizziness, etc.; appears comfortable; NSR on monitor

## 2022-03-20 LAB — SARS-COV-2 RNA SPEC QL NAA+PROBE: SIGNIFICANT CHANGE UP

## 2022-03-20 RX ADMIN — MOMETASONE FUROATE 1 PUFF(S): 220 INHALANT RESPIRATORY (INHALATION) at 10:09

## 2022-03-20 RX ADMIN — HEPARIN SODIUM 5000 UNIT(S): 5000 INJECTION INTRAVENOUS; SUBCUTANEOUS at 15:45

## 2022-03-20 RX ADMIN — MONTELUKAST 10 MILLIGRAM(S): 4 TABLET, CHEWABLE ORAL at 21:23

## 2022-03-20 RX ADMIN — LORATADINE 10 MILLIGRAM(S): 10 TABLET ORAL at 21:23

## 2022-03-20 RX ADMIN — HEPARIN SODIUM 5000 UNIT(S): 5000 INJECTION INTRAVENOUS; SUBCUTANEOUS at 05:17

## 2022-03-20 RX ADMIN — HEPARIN SODIUM 5000 UNIT(S): 5000 INJECTION INTRAVENOUS; SUBCUTANEOUS at 21:23

## 2022-03-20 RX ADMIN — AMLODIPINE BESYLATE 5 MILLIGRAM(S): 2.5 TABLET ORAL at 05:17

## 2022-03-20 RX ADMIN — Medication 1 TABLET(S): at 15:46

## 2022-03-20 RX ADMIN — MOMETASONE FUROATE 1 PUFF(S): 220 INHALANT RESPIRATORY (INHALATION) at 21:22

## 2022-03-20 RX ADMIN — Medication 81 MILLIGRAM(S): at 15:46

## 2022-03-20 RX ADMIN — ATORVASTATIN CALCIUM 40 MILLIGRAM(S): 80 TABLET, FILM COATED ORAL at 21:23

## 2022-03-20 NOTE — PROGRESS NOTE ADULT - NSPROGADDITIONALINFOA_GEN_ALL_CORE
Differential diagnosis and plan of care discussed with patient after the evaluation.   Advanced care planning/advanced directives discussed with patient/family. DNR status including forceful chest compressions to attempt to restart the heart, ventilator support/artificial breathing, electric shock, artificial nutrition, health care proxy, Molst form all discussed with pt. Pt wishes to consider. OMT on six regions for acute somatic dysfunctions done at the bedside   Importance of Fall prevention discussed. I had a prolonged conversation with patient. More than 50% of the visit was spent counseling and/or coordinating care by the attending physician.
Differential diagnosis and plan of care discussed with patient after the evaluation.   Advanced care planning/advanced directives discussed with patient/family. DNR status including forceful chest compressions to attempt to restart the heart, ventilator support/artificial breathing, electric shock, artificial nutrition, health care proxy, Molst form all discussed with pt. Pt wishes to consider. OMT on six regions for acute somatic dysfunctions done at the bedside   Importance of Fall prevention discussed. I had a prolonged conversation with patient. More than 50% of the visit was spent counseling and/or coordinating care by the attending physician.

## 2022-03-21 ENCOUNTER — TRANSCRIPTION ENCOUNTER (OUTPATIENT)
Age: 57
End: 2022-03-21

## 2022-03-21 VITALS
OXYGEN SATURATION: 99 % | RESPIRATION RATE: 18 BRPM | HEART RATE: 68 BPM | DIASTOLIC BLOOD PRESSURE: 87 MMHG | SYSTOLIC BLOOD PRESSURE: 135 MMHG | TEMPERATURE: 98 F

## 2022-03-21 LAB
ANION GAP SERPL CALC-SCNC: 11 MMOL/L — SIGNIFICANT CHANGE UP (ref 7–14)
APTT BLD: 38 SEC — HIGH (ref 27–36.3)
BLD GP AB SCN SERPL QL: NEGATIVE — SIGNIFICANT CHANGE UP
BUN SERPL-MCNC: 18 MG/DL — SIGNIFICANT CHANGE UP (ref 7–23)
CALCIUM SERPL-MCNC: 9.1 MG/DL — SIGNIFICANT CHANGE UP (ref 8.4–10.5)
CHLORIDE SERPL-SCNC: 102 MMOL/L — SIGNIFICANT CHANGE UP (ref 98–107)
CO2 SERPL-SCNC: 23 MMOL/L — SIGNIFICANT CHANGE UP (ref 22–31)
CREAT SERPL-MCNC: 0.92 MG/DL — SIGNIFICANT CHANGE UP (ref 0.5–1.3)
EGFR: 97 ML/MIN/1.73M2 — SIGNIFICANT CHANGE UP
GLUCOSE SERPL-MCNC: 95 MG/DL — SIGNIFICANT CHANGE UP (ref 70–99)
HCT VFR BLD CALC: 40.8 % — SIGNIFICANT CHANGE UP (ref 39–50)
HGB BLD-MCNC: 13.3 G/DL — SIGNIFICANT CHANGE UP (ref 13–17)
INR BLD: 1.02 RATIO — SIGNIFICANT CHANGE UP (ref 0.88–1.16)
MAGNESIUM SERPL-MCNC: 2.1 MG/DL — SIGNIFICANT CHANGE UP (ref 1.6–2.6)
MCHC RBC-ENTMCNC: 28.4 PG — SIGNIFICANT CHANGE UP (ref 27–34)
MCHC RBC-ENTMCNC: 32.6 GM/DL — SIGNIFICANT CHANGE UP (ref 32–36)
MCV RBC AUTO: 87 FL — SIGNIFICANT CHANGE UP (ref 80–100)
NRBC # BLD: 0 /100 WBCS — SIGNIFICANT CHANGE UP
NRBC # FLD: 0 K/UL — SIGNIFICANT CHANGE UP
PHOSPHATE SERPL-MCNC: 3.5 MG/DL — SIGNIFICANT CHANGE UP (ref 2.5–4.5)
PLATELET # BLD AUTO: 184 K/UL — SIGNIFICANT CHANGE UP (ref 150–400)
POTASSIUM SERPL-MCNC: 4 MMOL/L — SIGNIFICANT CHANGE UP (ref 3.5–5.3)
POTASSIUM SERPL-SCNC: 4 MMOL/L — SIGNIFICANT CHANGE UP (ref 3.5–5.3)
PROTHROM AB SERPL-ACNC: 11.8 SEC — SIGNIFICANT CHANGE UP (ref 10.5–13.4)
RBC # BLD: 4.69 M/UL — SIGNIFICANT CHANGE UP (ref 4.2–5.8)
RBC # FLD: 14.7 % — HIGH (ref 10.3–14.5)
RH IG SCN BLD-IMP: POSITIVE — SIGNIFICANT CHANGE UP
SODIUM SERPL-SCNC: 136 MMOL/L — SIGNIFICANT CHANGE UP (ref 135–145)
WBC # BLD: 4.5 K/UL — SIGNIFICANT CHANGE UP (ref 3.8–10.5)
WBC # FLD AUTO: 4.5 K/UL — SIGNIFICANT CHANGE UP (ref 3.8–10.5)

## 2022-03-21 PROCEDURE — 93458 L HRT ARTERY/VENTRICLE ANGIO: CPT | Mod: 26

## 2022-03-21 PROCEDURE — 99152 MOD SED SAME PHYS/QHP 5/>YRS: CPT

## 2022-03-21 RX ORDER — ATORVASTATIN CALCIUM 80 MG/1
1 TABLET, FILM COATED ORAL
Qty: 30 | Refills: 0
Start: 2022-03-21 | End: 2022-04-19

## 2022-03-21 RX ADMIN — Medication 81 MILLIGRAM(S): at 08:47

## 2022-03-21 RX ADMIN — AMLODIPINE BESYLATE 5 MILLIGRAM(S): 2.5 TABLET ORAL at 05:20

## 2022-03-21 RX ADMIN — Medication 1 TABLET(S): at 12:34

## 2022-03-21 RX ADMIN — HEPARIN SODIUM 5000 UNIT(S): 5000 INJECTION INTRAVENOUS; SUBCUTANEOUS at 14:27

## 2022-03-21 RX ADMIN — HEPARIN SODIUM 5000 UNIT(S): 5000 INJECTION INTRAVENOUS; SUBCUTANEOUS at 05:20

## 2022-03-21 NOTE — CHART NOTE - NSCHARTNOTEFT_GEN_A_CORE
Pt seen earlier for RRA site check after his C today.   Pt denies any symptoms including pain, swelling, bruising, hematoma, bleeding, numbness or tingling; or systemic symptoms such as chest pain or SOB, headache, nausea, vomiting, fatigue.  On exam, RUE strength and FROM preserved; all 5 digits with cap refill <2 seconds and  sensation preserved, neurovascularly intact. No tenderness to palpation, edema, bruising, hematoma, bleeding or discoloration observed. Pressure bandage c/d/i.

## 2022-03-21 NOTE — DISCHARGE NOTE PROVIDER - PROVIDER TOKENS
PROVIDER:[TOKEN:[[id number]],FOLLOWUP:[2 weeks]],FREE:[LAST:[Your Primary Care Provider],PHONE:[(   )    -],FAX:[(   )    -],FOLLOWUP:[1 week]]

## 2022-03-21 NOTE — DISCHARGE NOTE PROVIDER - HOSPITAL COURSE
55 yo M with h/o Asthma last hospitalized 10 years ago, intubated Twice - last intubation in 1996, HTN, Lupus Psoriasis ( topical steroids), NO DM or HLD,  presents with chest pain x 1 day. Pain started on 3/17 inleft axilla area and radiates to Substernal area and moved to midchest and to L shoulder. at present pain is 2/10 , was worse last night 5/10 but responded to Tylenol. He notes SOB on admission but this has subsided. No palpitations or N/V.   Patient was observed in CDU  3/18 DDimer <150, HS trop <6--> 7, Pro-bnp 29. Covid 3/18- negative, cxr- clear  Patient had echo and nuclear stress test- found to have abnormal nuclear stress test on 3/18 .  Seen by cardiology with plan for cardiac cath  started on asa 324 mg on 3/18      Acute chest pain.   - 3/18 DDimer <150, HS trop <6--> 7, Pro-bnp 29. Covid 3/18- negative, cxr- clear  Patient had echo and nuclear stress test- found to have abnormal nuclear stress test on 3/18 .  Seen by cardiology , [facility] today 3/21/22 with mild disease/ luminal irregularities, no interventions, RRA site stable.  Cleared by Cardiology Dr. Medina for dc on home amlodipine and low dose 10mg Lipitor qhs, no aspirin needed.  s/p Tylenol prn mild pain; Morphine 2 mg q4 prn mod to severe pain  started on asa 324 mg 3/18, c/w asa 81 mg QD on 3/19  --> after [facility], no interventions, no longer needs aspirin per Dr. Medina cardiology  Hgb aic 5.5  Monitor FLP   s/p statin Lipitor at 40 mg qhs.--> After [facility], no interventions and only luminal irregularities, sending on 10mg lipitor qhs per Dr. Medina  cardiology    Hypertension.   - c/w Norvasc 5 mg qd hold sbp <100.    Asthma  -c/w Asmanex and Albuterol and Singulair  no asthma exacerbation at this time.    Discussed patient with Dr. Negron on 3/21/22, patient is medically stable and cleared for discharge.      57 yo M with h/o Asthma last hospitalized 10 years ago, intubated Twice - last intubation in 1996, HTN, Lupus Psoriasis ( topical steroids), NO DM or HLD,  presents with chest pain x 1 day. Pain started on 3/17 inleft axilla area and radiates to Substernal area and moved to midchest and to L shoulder. at present pain is 2/10 , was worse last night 5/10 but responded to Tylenol. He notes SOB on admission but this has subsided. No palpitations or N/V.   Patient was observed in CDU  3/18 DDimer <150, HS trop <6--> 7, Pro-bnp 29. Covid 3/18- negative, cxr- clear  Patient had echo and nuclear stress test- found to have abnormal nuclear stress test on 3/18 .  Seen by cardiology with plan for cardiac cath  started on asa 324 mg on 3/18      Acute chest pain.   - 3/18 DDimer <150, HS trop <6--> 7, Pro-bnp 29. Covid 3/18- negative, cxr- clear  Patient had echo and nuclear stress test- found to have abnormal nuclear stress test on 3/18 .  Seen by cardiology , [facility] today 3/21/22 with mild disease/ luminal irregularities, no interventions, RRA site stable.  Cleared by Cardiology Dr. Medina for dc on home amlodipine and low dose 10mg Lipitor qhs, no aspirin needed.  s/p Tylenol prn mild pain; Morphine 2 mg q4 prn mod to severe pain  started on asa 324 mg 3/18, c/w asa 81 mg QD on 3/19  --> after [facility], no interventions, no longer needs aspirin per Dr. Medina cardiology  Hgb aic 5.5  Monitor FLP   s/p statin Lipitor at 40 mg qhs.--> After [facility], no interventions and only luminal irregularities, sending on 10mg lipitor qhs per Dr. Medina  cardiology    Hypertension.   - c/w Norvasc 5 mg qd hold sbp <100.    Asthma  -c/w Asmanex and Albuterol and Singulair  no asthma exacerbation at this time.

## 2022-03-21 NOTE — DISCHARGE NOTE NURSING/CASE MANAGEMENT/SOCIAL WORK - NSDCPEFALRISK_GEN_ALL_CORE
For information on Fall & Injury Prevention, visit: [email or web address] OR  [email or web address] OR  https://www.cdc.gov/steadi/patient.html

## 2022-03-21 NOTE — DISCHARGE NOTE PROVIDER - NSDCFUADDAPPT_GEN_ALL_CORE_FT
See your Primary Care Provider within 1 week. See Dr. Medina Cardiology for a check-up with 2 weeks. Your home medications stayed the same, and we started a low-dose statin called Lipitor 10mg to be taken at bedtime nightly; this was sent to your requested pharmacy.

## 2022-03-21 NOTE — DISCHARGE NOTE NURSING/CASE MANAGEMENT/SOCIAL WORK - PATIENT PORTAL LINK FT
You can access the FollowMyHealth Patient Portal offered by [facility] by registering at the following website: [email or web address]. By joining [facility]’s FollowMyHealth portal, you will also be able to view your health information using other applications (apps) compatible with our system.

## 2022-03-21 NOTE — PROGRESS NOTE ADULT - SUBJECTIVE AND OBJECTIVE BOX
Name of Patient : SARAH JAQUEZ  MRN: [id number]  Date of visit: 03-19-22      Subjective: Patient seen and examined. No new events except as noted.   Doing okay  no chest pain  plan for cath on monday     REVIEW OF SYSTEMS:    CONSTITUTIONAL: No weakness, fevers or chills  EYES/ENT: No visual changes;  No vertigo or throat pain   NECK: No pain or stiffness  RESPIRATORY: No cough, wheezing, hemoptysis; No shortness of breath  CARDIOVASCULAR: No chest pain or palpitations  GASTROINTESTINAL: No abdominal or epigastric pain. No nausea, vomiting, or hematemesis; No diarrhea or constipation. No melena or hematochezia.  GENITOURINARY: No dysuria, frequency or hematuria  NEUROLOGICAL: No numbness or weakness  SKIN: No itching, burning, rashes, or lesions   All other review of systems is negative unless indicated above.    MEDICATIONS:  MEDICATIONS  (STANDING):  amLODIPine   Tablet 5 milliGRAM(s) Oral daily  aspirin  chewable 81 milliGRAM(s) Oral daily  atorvastatin 40 milliGRAM(s) Oral at bedtime  heparin   Injectable 5000 Unit(s) SubCutaneous every 8 hours  influenza   Vaccine 0.5 milliLiter(s) IntraMuscular once  loratadine 10 milliGRAM(s) Oral daily  mometasone 220 MICROgram(s) Inhaler 1 Puff(s) Inhalation two times a day  montelukast 10 milliGRAM(s) Oral daily  multivitamin 1 Tablet(s) Oral daily      PHYSICAL EXAM:  T(C): 36.4 (03-19-22 @ 22:00), Max: 36.9 (03-19-22 @ 04:54)  HR: 71 (03-19-22 @ 22:00) (66 - 74)  BP: 151/99 (03-19-22 @ 22:00) (102/67 - 151/99)  RR: 17 (03-19-22 @ 22:00) (16 - 19)  SpO2: 100% (03-19-22 @ 22:00) (93% - 100%)  Wt(kg): --  I&O's Summary        Appearance: Normal	  HEENT:  PERRLA   Lymphatic: No lymphadenopathy   Cardiovascular: Normal S1 S2, no JVD  Respiratory: normal effort , clear  Gastrointestinal:  Soft, Non-tender  Skin: No rashes,  warm to touch  Psychiatry:  Mood & affect appropriate  Musculuskeletal: No edema      All labs, Imaging and EKGs personally reviewed                           13.9   4.88  )-----------( 192      ( 19 Mar 2022 06:38 )             40.5               03-19    139  |  103  |  16  ----------------------------<  99  3.9   |  23  |  1.02    Ca    8.9      19 Mar 2022 06:38  Phos  3.5     03-19  Mg     2.20     03-19    TPro  7.7  /  Alb  4.4  /  TBili  0.3  /  DBili  x   /  AST  31  /  ALT  23  /  AlkPhos  48  03-18                                           
Date of Service   03-19-22 @ 14:41    Patient is a 56y old  Male who presents with a chief complaint of chest pain (18 Mar 2022 14:49)      INTERVAL HISTORY: pt feels ok   TELEMETRY Personally reviewed:     REVIEW OF SYSTEMS:   CONSTITUTIONAL: No weakness  EYES/ENT: No visual changes; No throat pain  Neck: No pain or stiffness  Respiratory: No cough, wheezing, No shortness of breath  CARDIOVASCULAR: no chest pain or palpitations  GASTROINTESTINAL: No abdominal pain, no nausea, vomiting or hematemesis  GENITOURINARY: No dysuria, frequency or hematuria  NEUROLOGICAL: No stroke like symptoms  SKIN: No rashes    	  MEDICATIONS:  amLODIPine   Tablet 5 milliGRAM(s) Oral daily        PHYSICAL EXAM:  T(C): 36.6 (03-19-22 @ 12:46), Max: 36.9 (03-19-22 @ 04:54)  HR: 71 (03-19-22 @ 12:46) (61 - 72)  BP: 126/88 (03-19-22 @ 12:53) (102/67 - 163/108)  RR: 18 (03-19-22 @ 12:46) (14 - 18)  SpO2: 93% (03-19-22 @ 12:46) (93% - 98%)  Wt(kg): --  I&O's Summary        Appearance: In no distress	  HEENT:    PERRL, EOMI	  Cardiovascular:  S1 S2, No JVD  Respiratory: Lungs clear to auscultation	  Gastrointestinal:  Soft, Non-tender, + BS	  Vascularature:  No edema of LE  Psychiatric: Appropriate affect   Neuro: no acute focal deficits                               13.9   4.88  )-----------( 192      ( 19 Mar 2022 06:38 )             40.5     03-19    139  |  103  |  16  ----------------------------<  99  3.9   |  23  |  1.02    Ca    8.9      19 Mar 2022 06:38  Phos  3.5     03-19  Mg     2.20     03-19    TPro  7.7  /  Alb  4.4  /  TBili  0.3  /  DBili  x   /  AST  31  /  ALT  23  /  AlkPhos  48  03-18        Labs personally reviewed      ASSESSMENT/PLAN: 	  55 yo M with h/o Asthma last hospitalized 10 years ago, intubated Twice - last intubation in 1996, HTN, Lupus Psoriasis ( topical steroids), NO DM or HLD,  presents with chest pain x 1 day. Pain started on 3/17 inleft axilla area and radiates to Substernal area and moved to midchest and to L shoulder. at present pain is 2/10 , was worse last night 5/10 but responded to Tylenol. He notes SOB on admission but this has subsided. No palpitations or N/V.   Patient was observed in CDU  3/18 DDimer <150, HS trop <6--> 7, Pro-bnp 29. Covid 3/18- negative, cxr- clear  Patient had echo and nuclear stress test- found to have abnormal nuclear stress test on 3/18 .  Seen by cardiology with plan for cardiac cath  started on asa 324 mg on 3/18    Problem/Plan - 1:  ·  Problem: Acute chest pain.   ·  Plan: 3/18 DDimer <150, HS trop <6--> 7, Pro-bnp 29. Covid 3/18- negative, cxr- clear  Patient had echo and nuclear stress test- found to have abnormal nuclear stress test on 3/18 .  RIsks and benefits of cardiac cath discussed with pt, agreeable to proceed given recurrent pain  started on asa 324 mg 3/18, c/w asa 81 mg QD on 3/19  Hgb aic 5.5  Monitor FLP in AM  Start statin Lipitor at 40 mg qhs until CAD ruled in/out    Problem/Plan - 2:  ·  Problem: Hypertension.   ·  Plan: c/w Norvasc 5 mg qd hold sbp <100.  - BP stable     Problem/Plan - 3:  ·  Problem: Asthma.   ·  Plan: c/w Asmanex and Albuterol and Singulair  no asthma exacerbation at this time.    Problem/Plan - 4:  ·  Problem: Prophylactic measure.   ·  Plan: hep SQ  Covid swab on 3/20 Ryan for cath Monday            Antoni Vitale [facility]   Sonny Medina DO [facility]  Cardiovascular Medicine  [address], [address]  Office: [phone]  
Date of Service   03-20-22 @ 12:32    Patient is a 56y old  Male who presents with a chief complaint of chest pain (19 Mar 2022 14:41)      INTERVAL HISTORY: pt feels ok   TELEMETRY Personally reviewed: SR 70's    REVIEW OF SYSTEMS:   CONSTITUTIONAL: No weakness  EYES/ENT: No visual changes; No throat pain  Neck: No pain or stiffness  Respiratory: No cough, wheezing, No shortness of breath  CARDIOVASCULAR: no chest pain or palpitations  GASTROINTESTINAL: No abdominal pain, no nausea, vomiting or hematemesis  GENITOURINARY: No dysuria, frequency or hematuria  NEUROLOGICAL: No stroke like symptoms  SKIN: No rashes    	  MEDICATIONS:  amLODIPine   Tablet 5 milliGRAM(s) Oral daily        PHYSICAL EXAM:  T(C): 36.3 (03-20-22 @ 10:00), Max: 36.6 (03-19-22 @ 12:46)  HR: 73 (03-20-22 @ 10:00) (58 - 74)  BP: 135/85 (03-20-22 @ 10:00) (126/88 - 151/99)  RR: 17 (03-20-22 @ 10:00) (17 - 19)  SpO2: 97% (03-20-22 @ 10:00) (93% - 100%)  Wt(kg): --  I&O's Summary    19 Mar 2022 07:01  -  20 Mar 2022 07:00  --------------------------------------------------------  IN: 480 mL / OUT: 0 mL / NET: 480 mL          Appearance: In no distress	  HEENT:    PERRL, EOMI	  Cardiovascular:  S1 S2, No JVD  Respiratory: Lungs clear to auscultation	  Gastrointestinal:  Soft, Non-tender, + BS	  Vascularature:  No edema of LE  Psychiatric: Appropriate affect   Neuro: no acute focal deficits                               13.9   4.88  )-----------( 192      ( 19 Mar 2022 06:38 )             40.5     03-19    139  |  103  |  16  ----------------------------<  99  3.9   |  23  |  1.02    Ca    8.9      19 Mar 2022 06:38  Phos  3.5     03-19  Mg     2.20     03-19          Labs personally reviewed      ASSESSMENT/PLAN: 	  55 yo M with h/o Asthma last hospitalized 10 years ago, intubated Twice - last intubation in 1996, HTN, Lupus Psoriasis ( topical steroids), NO DM or HLD,  presents with chest pain x 1 day. Pain started on 3/17 inleft axilla area and radiates to Substernal area and moved to midchest and to L shoulder. at present pain is 2/10 , was worse last night 5/10 but responded to Tylenol. He notes SOB on admission but this has subsided. No palpitations or N/V.   Patient was observed in CDU  3/18 DDimer <150, HS trop <6--> 7, Pro-bnp 29. Covid 3/18- negative, cxr- clear  Patient had echo and nuclear stress test- found to have abnormal nuclear stress test on 3/18 .  started on asa 324 mg on 3/18      Problem/Plan - 1:  ·  Problem: Acute chest pain.   ·  Plan: 3/18 DDimer <150, HS trop <6--> 7, Pro-bnp 29. Covid 3/18- negative, cxr- clear  Patient had echo and nuclear stress test- found to have abnormal nuclear stress test on 3/18 .  RIsks and benefits of cardiac cath discussed with pt, agreeable to proceed given recurrent pain  s/p asa 324 mg 3/18, c/w asa 81 mg QD on 3/19  Hgb aic 5.5  Start statin Lipitor at 40 mg qhs until CAD ruled in/out  For diagnostic [facility] tomorrow     Problem/Plan - 2:  ·  Problem: Hypertension.   ·  Plan: c/w Norvasc 5 mg qd hold sbp <100.  - BP stable     Problem/Plan - 3:  ·  Problem: Asthma.   ·  Plan: c/w Asmanex and Albuterol and Singulair  no asthma exacerbation at this time.    Problem/Plan - 4:  ·  Problem: Prophylactic measure.   ·  Plan: hep SQ  Covid swab on 3/20 Ryan for cath Monday    Problem/Plan - 5:  ·  Problem: HLD   - started on Lipitor 40mg       Antoni Vitale [facility]   Sonny Medina DO [facility]  Cardiovascular Medicine  [address], [address]  Office: [phone]  
Name of Patient : SARAH JAQUEZ  MRN: [id number]  Date of visit: 03-20-22 @ 14:00      Subjective: Patient seen and examined. No new events except as noted.   Doing okay     REVIEW OF SYSTEMS:    CONSTITUTIONAL: No weakness, fevers or chills  EYES/ENT: No visual changes;  No vertigo or throat pain   NECK: No pain or stiffness  RESPIRATORY: No cough, wheezing, hemoptysis; No shortness of breath  CARDIOVASCULAR: No chest pain or palpitations  GASTROINTESTINAL: No abdominal or epigastric pain. No nausea, vomiting, or hematemesis; No diarrhea or constipation. No melena or hematochezia.  GENITOURINARY: No dysuria, frequency or hematuria  NEUROLOGICAL: No numbness or weakness  SKIN: No itching, burning, rashes, or lesions   All other review of systems is negative unless indicated above.    MEDICATIONS:  MEDICATIONS  (STANDING):  amLODIPine   Tablet 5 milliGRAM(s) Oral daily  aspirin  chewable 81 milliGRAM(s) Oral daily  atorvastatin 40 milliGRAM(s) Oral at bedtime  heparin   Injectable 5000 Unit(s) SubCutaneous every 8 hours  influenza   Vaccine 0.5 milliLiter(s) IntraMuscular once  loratadine 10 milliGRAM(s) Oral daily  mometasone 220 MICROgram(s) Inhaler 1 Puff(s) Inhalation two times a day  montelukast 10 milliGRAM(s) Oral daily  multivitamin 1 Tablet(s) Oral daily      PHYSICAL EXAM:  T(C): 36.3 (03-20-22 @ 10:00), Max: 36.5 (03-20-22 @ 05:15)  HR: 73 (03-20-22 @ 10:00) (58 - 74)  BP: 135/85 (03-20-22 @ 10:00) (128/85 - 151/99)  RR: 17 (03-20-22 @ 10:00) (17 - 19)  SpO2: 97% (03-20-22 @ 10:00) (95% - 100%)  Wt(kg): --  I&O's Summary    19 Mar 2022 07:01  -  20 Mar 2022 07:00  --------------------------------------------------------  IN: 480 mL / OUT: 0 mL / NET: 480 mL          Appearance: Normal	  HEENT:  PERRLA   Lymphatic: No lymphadenopathy   Cardiovascular: Normal S1 S2, no JVD  Respiratory: normal effort , clear  Gastrointestinal:  Soft, Non-tender  Skin: No rashes,  warm to touch  Psychiatry:  Mood & affect appropriate  Musculuskeletal: No edema      All labs, Imaging and EKGs personally reviewed     03-19-22 @ 07:01  -  03-20-22 @ 07:00  --------------------------------------------------------  IN: 480 mL / OUT: 0 mL / NET: 480 mL                          13.9   4.88  )-----------( 192      ( 19 Mar 2022 06:38 )             40.5               03-19    139  |  103  |  16  ----------------------------<  99  3.9   |  23  |  1.02    Ca    8.9      19 Mar 2022 06:38  Phos  3.5     03-19  Mg     2.20     03-19                                   
Name of Patient : SARAH JAQUEZ  MRN: [id number]  Date of visit: 03-21-22 @ 14:30      Subjective: Patient seen and examined. No new events except as noted.   Doing okay  cath today,     REVIEW OF SYSTEMS:    CONSTITUTIONAL: No weakness, fevers or chills  EYES/ENT: No visual changes;  No vertigo or throat pain   NECK: No pain or stiffness  RESPIRATORY: No cough, wheezing, hemoptysis; No shortness of breath  CARDIOVASCULAR: No chest pain or palpitations  GASTROINTESTINAL: No abdominal or epigastric pain. No nausea, vomiting, or hematemesis; No diarrhea or constipation. No melena or hematochezia.  GENITOURINARY: No dysuria, frequency or hematuria  NEUROLOGICAL: No numbness or weakness  SKIN: No itching, burning, rashes, or lesions   All other review of systems is negative unless indicated above.    MEDICATIONS:  MEDICATIONS  (STANDING):  amLODIPine   Tablet 5 milliGRAM(s) Oral daily  aspirin  chewable 81 milliGRAM(s) Oral daily  atorvastatin 40 milliGRAM(s) Oral at bedtime  heparin   Injectable 5000 Unit(s) SubCutaneous every 8 hours  influenza   Vaccine 0.5 milliLiter(s) IntraMuscular once  loratadine 10 milliGRAM(s) Oral daily  mometasone 220 MICROgram(s) Inhaler 1 Puff(s) Inhalation two times a day  montelukast 10 milliGRAM(s) Oral daily  multivitamin 1 Tablet(s) Oral daily      PHYSICAL EXAM:  T(C): 36.6 (03-21-22 @ 12:40), Max: 36.7 (03-21-22 @ 05:25)  HR: 77 (03-21-22 @ 12:40) (60 - 77)  BP: 128/94 (03-21-22 @ 12:40) (115/64 - 142/83)  RR: 18 (03-21-22 @ 12:40) (18 - 19)  SpO2: 100% (03-21-22 @ 12:40) (95% - 100%)  Wt(kg): --  I&O's Summary        Appearance: Normal	  HEENT:  PERRLA   Lymphatic: No lymphadenopathy   Cardiovascular: Normal S1 S2, no JVD  Respiratory: normal effort , clear  Gastrointestinal:  Soft, Non-tender  Skin: No rashes,  warm to touch  Psychiatry:  Mood & affect appropriate  Musculuskeletal: No edema      All labs, Imaging and EKGs personally reviewed                           13.3   4.50  )-----------( 184      ( 21 Mar 2022 07:31 )             40.8               03-21    136  |  102  |  18  ----------------------------<  95  4.0   |  23  |  0.92    Ca    9.1      21 Mar 2022 07:31  Phos  3.5     03-21  Mg     2.10     03-21      PT/INR - ( 21 Mar 2022 07:31 )   PT: 11.8 sec;   INR: 1.02 ratio         PTT - ( 21 Mar 2022 07:31 )  PTT:38.0 sec

## 2022-03-21 NOTE — PROGRESS NOTE ADULT - PROBLEM SELECTOR PLAN 1
3/18 DDimer <150, HS trop <6--> 7, Pro-bnp 29. Covid 3/18- negative, cxr- clear  Patient had echo and nuclear stress test- found to have abnormal nuclear stress test on 3/18 .  Seen by cardiology with plan for cardiac cath  Tylenol prn mild pain.  Morphine 2 mg q4 prn mod to severe pain  started on asa 324 mg 3/18, c/w asa 81 mg QD on 3/19  Hgb aic 5.5  Monitor FLP   Cont statin Lipitor at 40 mg qhs
3/18 DDimer <150, HS trop <6--> 7, Pro-bnp 29. Covid 3/18- negative, cxr- clear  Patient had echo and nuclear stress test- found to have abnormal nuclear stress test on 3/18 .  Seen by cardiology with plan for cardiac cath  Tylenol prn mild pain.  Morphine 2 mg q4 prn mod to severe pain  started on asa 324 mg 3/18, c/w asa 81 mg QD on 3/19  Hgb aic 5.5  Monitor FLP   Start statin Lipitor at 40 mg qhs
3/18 DDimer <150, HS trop <6--> 7, Pro-bnp 29. Covid 3/18- negative, cxr- clear  Patient had echo and nuclear stress test- found to have abnormal nuclear stress test on 3/18 .  Seen by cardiology , cath today   Tylenol prn mild pain.  Morphine 2 mg q4 prn mod to severe pain  started on asa 324 mg 3/18, c/w asa 81 mg QD on 3/19  Hgb aic 5.5  Monitor FLP   Cont statin Lipitor at 40 mg qhs

## 2022-03-21 NOTE — DISCHARGE NOTE PROVIDER - NSDCFUADDINST_GEN_ALL_CORE_FT
No driving or operating heavy machinery x24 hours after your heart catheterization.   No heavy lifting more than 10 lbs, exercise, or straining with your Right arm x 7days.

## 2022-03-21 NOTE — PROGRESS NOTE ADULT - PROBLEM SELECTOR PLAN 3
c/w Asmanex and Albuterol and Singulair  no asthma exacerbation at this time.

## 2022-03-21 NOTE — DISCHARGE NOTE PROVIDER - NSDCCPCAREPLAN_GEN_ALL_CORE_FT
PRINCIPAL DISCHARGE DIAGNOSIS  Diagnosis: Chest pain  Assessment and Plan of Treatment: You came to [facility] for chest pain and were worked up. You did not have a heart attack. You had a nuclear stress test which was abnormal on 3/18/22, so you went for a left heart catheterization on 3/21/22. This heart cath (visualization of your heart arteries) showed only mild disease with "luminal irregularities" (some vessel irregularities), but did not require any intervention - you did not get any stents. You were cleared by Medicine and Dr. Medina cardiology to go home after this result and since you are feeling much better.   Your Right Radial Artery site was accessed, so keep an eye out for any new swelling, bleeding, bruising, pain, numbness, tingling, weakness, color changes,  or any changes in your Right Arm, Hand or Fingers. If bleeding begins, hold tight pressure immediately and call 911 or return to the hospital as arteries bleed fast. No heavy lifting  with your Right arm greater than 10 pounds for one week; no straining or exercise with your Right arm for one week.     See your Primary Care Provider within 1 week.  See Dr. Medina Cardiology for a check-up with 2 weeks.  Your home medications stayed the same, and we started a low-dose statin called Lipitor 10mg to be taken at bedtime nightly; this was sent to your requested pharmacy.  Call 911 or retunr to the hospital with any return of or new chest pain, trouble breathing, or any new/concerning symptoms or emergencies, or any symptoms with your Right Upper Extremity as described above.        SECONDARY DISCHARGE DIAGNOSES  Diagnosis: Asthma  Assessment and Plan of Treatment: You were not in an asthma exacerbation at this time. Continue your home inhalers and medications as usual, and see your Primary Care within 1 week. Call 911 with any difficulty breathing not relieved by your inhalers.    Diagnosis: Hypertension  Assessment and Plan of Treatment: Continue your home amlodipine. Monitor your blood pressure at home; goal is 120/80. Follow up with your Primary Care and Cardiologist for monitoring. Eat a heart healthy and low sodium diet. Eat a diet with plenty of fresh fruits and vegetables, whole grains, lean proteins. Avoid fatty/greasy/processed unhealthy foods. Do not use tobacco.

## 2022-03-21 NOTE — PROGRESS NOTE ADULT - PROBLEM SELECTOR PLAN 4
hep SQ    plan d/w patient   Plan for cardiac cath on Monday 3/21  Covid swab on 3/20 Ryan
hep SQ    plan d/w patient
hep SQ    plan d/w patient   Plan for cardiac cath on Monday 3/21  Covid swab on 3/20 Ryan

## 2022-03-21 NOTE — DISCHARGE NOTE PROVIDER - NSDCMRMEDTOKEN_GEN_ALL_CORE_FT
albuterol 90 mcg/inh inhalation aerosol: 2 puff(s) inhaled every 6 hours, As Needed  amLODIPine 5 mg oral tablet: 1 tab(s) orally once a day  Asmanex  mcg/inh inhalation aerosol: 1 puff(s) inhaled 2 times a day  atorvastatin 10 mg oral tablet: 1 tab(s) orally once a day (at bedtime)   Multiple Vitamins oral tablet: 1 tab(s) orally once a day  Singulair 10 mg oral tablet: 1 tab(s) orally once a day  ZyrTEC 10 mg oral tablet: 1 tab(s) orally once a day

## 2022-03-21 NOTE — DISCHARGE NOTE PROVIDER - CARE PROVIDER_API CALL
Sonny Medina (DO)  Cardiovascular Disease; Internal Medicine; Nuclear Cardiology  [address] [place] [address], [place] [address]  Phone: [phone]  Fax: [phone]  Follow Up Time: 2 weeks    Your Primary Care Provider,   Phone: (   )    -  Fax: (   )    -  Follow Up Time: 1 week

## 2022-03-21 NOTE — PROGRESS NOTE ADULT - ASSESSMENT
55 yo M with h/o Asthma last hospitalized 10 years ago, intubated Twice - last intubation in 1996, HTN, Lupus Psoriasis ( topical steroids), NO DM or HLD,  presents with chest pain x 1 day. Pain started on 3/17 inleft axilla area and radiates to Substernal area and moved to midchest and to L shoulder. at present pain is 2/10 , was worse last night 5/10 but responded to Tylenol. He notes SOB on admission but this has subsided. No palpitations or N/V.   Patient was observed in CDU  3/18 DDimer <150, HS trop <6--> 7, Pro-bnp 29. Covid 3/18- negative, cxr- clear  Patient had echo and nuclear stress test- found to have abnormal nuclear stress test on 3/18 .  Seen by cardiology with plan for cardiac cath  started on asa 324 mg on 3/18  
57 yo M with h/o Asthma last hospitalized 10 years ago, intubated Twice - last intubation in 1996, HTN, Lupus Psoriasis ( topical steroids), NO DM or HLD,  presents with chest pain x 1 day. Pain started on 3/17 inleft axilla area and radiates to Substernal area and moved to midchest and to L shoulder. at present pain is 2/10 , was worse last night 5/10 but responded to Tylenol. He notes SOB on admission but this has subsided. No palpitations or N/V.   Patient was observed in CDU  3/18 DDimer <150, HS trop <6--> 7, Pro-bnp 29. Covid 3/18- negative, cxr- clear  Patient had echo and nuclear stress test- found to have abnormal nuclear stress test on 3/18 .  Seen by cardiology with plan for cardiac cath  started on asa 324 mg on 3/18  
57 yo M with h/o Asthma last hospitalized 10 years ago, intubated Twice - last intubation in 1996, HTN, Lupus Psoriasis ( topical steroids), NO DM or HLD,  presents with chest pain x 1 day. Pain started on 3/17 inleft axilla area and radiates to Substernal area and moved to midchest and to L shoulder. at present pain is 2/10 , was worse last night 5/10 but responded to Tylenol. He notes SOB on admission but this has subsided. No palpitations or N/V.   Patient was observed in CDU  3/18 DDimer <150, HS trop <6--> 7, Pro-bnp 29. Covid 3/18- negative, cxr- clear  Patient had echo and nuclear stress test- found to have abnormal nuclear stress test on 3/18 .  Seen by cardiology with plan for cardiac cath  started on asa 324 mg on 3/18

## 2022-03-21 NOTE — PROGRESS NOTE ADULT - PROBLEM SELECTOR PLAN 2
c/w Norvasc 5 mg qd hold sbp <100

## 2022-11-12 NOTE — PATIENT PROFILE ADULT - DO YOU LACK THE NECESSARY SUPPORT TO HELP YOU COPE WITH LIFE CHALLENGES?
no

## 2024-03-13 PROBLEM — I10 ESSENTIAL (PRIMARY) HYPERTENSION: Chronic | Status: ACTIVE | Noted: 2022-03-18

## 2024-03-13 PROBLEM — J45.909 UNSPECIFIED ASTHMA, UNCOMPLICATED: Chronic | Status: ACTIVE | Noted: 2022-03-18

## 2024-03-13 PROBLEM — L40.9 PSORIASIS, UNSPECIFIED: Chronic | Status: ACTIVE | Noted: 2022-03-18

## 2024-03-13 PROBLEM — M32.9 SYSTEMIC LUPUS ERYTHEMATOSUS, UNSPECIFIED: Chronic | Status: ACTIVE | Noted: 2022-03-18

## 2024-06-07 PROBLEM — Z00.00 ENCOUNTER FOR PREVENTIVE HEALTH EXAMINATION: Status: ACTIVE | Noted: 2024-06-07

## 2024-06-11 ENCOUNTER — APPOINTMENT (OUTPATIENT)
Dept: CARDIOLOGY | Facility: CLINIC | Age: 59
End: 2024-06-11

## 2025-04-29 NOTE — ED CDU PROVIDER INITIAL DAY NOTE - WR ORDER STATUS 1
Detail Level: Detailed Quality 226: Preventive Care And Screening: Tobacco Use: Screening And Cessation Intervention: Patient screened for tobacco use and is an ex/non-smoker Quality 137: Melanoma: Continuity Of Care - Recall System: Patient information entered into a recall system that includes: target date for the next exam specified AND a process to follow up with patients regarding missed or unscheduled appointments Resulted